# Patient Record
Sex: MALE | Race: WHITE | NOT HISPANIC OR LATINO | Employment: FULL TIME | URBAN - METROPOLITAN AREA
[De-identification: names, ages, dates, MRNs, and addresses within clinical notes are randomized per-mention and may not be internally consistent; named-entity substitution may affect disease eponyms.]

---

## 2025-07-25 ENCOUNTER — APPOINTMENT (OUTPATIENT)
Dept: RADIOLOGY | Facility: HOSPITAL | Age: 40
End: 2025-07-25

## 2025-07-25 ENCOUNTER — HOSPITAL ENCOUNTER (EMERGENCY)
Facility: HOSPITAL | Age: 40
Discharge: HOME | End: 2025-07-25

## 2025-07-25 VITALS
DIASTOLIC BLOOD PRESSURE: 85 MMHG | SYSTOLIC BLOOD PRESSURE: 136 MMHG | HEIGHT: 74 IN | HEART RATE: 75 BPM | OXYGEN SATURATION: 99 % | RESPIRATION RATE: 16 BRPM | BODY MASS INDEX: 22.46 KG/M2 | WEIGHT: 175 LBS | TEMPERATURE: 98.1 F

## 2025-07-25 DIAGNOSIS — M54.41 RIGHT-SIDED LOW BACK PAIN WITH RIGHT-SIDED SCIATICA, UNSPECIFIED CHRONICITY: Primary | ICD-10-CM

## 2025-07-25 PROCEDURE — 2500000001 HC RX 250 WO HCPCS SELF ADMINISTERED DRUGS (ALT 637 FOR MEDICARE OP): Performed by: PHYSICIAN ASSISTANT

## 2025-07-25 PROCEDURE — 96372 THER/PROPH/DIAG INJ SC/IM: CPT | Performed by: PHYSICIAN ASSISTANT

## 2025-07-25 PROCEDURE — 72131 CT LUMBAR SPINE W/O DYE: CPT

## 2025-07-25 PROCEDURE — 99284 EMERGENCY DEPT VISIT MOD MDM: CPT | Mod: 25

## 2025-07-25 PROCEDURE — 72131 CT LUMBAR SPINE W/O DYE: CPT | Performed by: STUDENT IN AN ORGANIZED HEALTH CARE EDUCATION/TRAINING PROGRAM

## 2025-07-25 PROCEDURE — 2500000004 HC RX 250 GENERAL PHARMACY W/ HCPCS (ALT 636 FOR OP/ED): Mod: JZ | Performed by: PHYSICIAN ASSISTANT

## 2025-07-25 RX ORDER — LIDOCAINE 50 MG/G
1 PATCH TOPICAL DAILY
Qty: 7 PATCH | Refills: 0 | Status: SHIPPED | OUTPATIENT
Start: 2025-07-25

## 2025-07-25 RX ORDER — KETOROLAC TROMETHAMINE 30 MG/ML
30 INJECTION, SOLUTION INTRAMUSCULAR; INTRAVENOUS ONCE
Status: COMPLETED | OUTPATIENT
Start: 2025-07-25 | End: 2025-07-25

## 2025-07-25 RX ORDER — TIZANIDINE 2 MG/1
2 TABLET ORAL EVERY 6 HOURS PRN
Qty: 30 TABLET | Refills: 0 | Status: SHIPPED | OUTPATIENT
Start: 2025-07-25 | End: 2025-08-04

## 2025-07-25 RX ORDER — CYCLOBENZAPRINE HCL 10 MG
5 TABLET ORAL ONCE
Status: COMPLETED | OUTPATIENT
Start: 2025-07-25 | End: 2025-07-25

## 2025-07-25 RX ORDER — OXYCODONE AND ACETAMINOPHEN 5; 325 MG/1; MG/1
1 TABLET ORAL ONCE
Refills: 0 | Status: COMPLETED | OUTPATIENT
Start: 2025-07-25 | End: 2025-07-25

## 2025-07-25 RX ORDER — METHYLPREDNISOLONE 4 MG/1
TABLET ORAL
Qty: 21 TABLET | Refills: 0 | Status: SHIPPED | OUTPATIENT
Start: 2025-07-25 | End: 2025-07-31

## 2025-07-25 RX ADMIN — KETOROLAC TROMETHAMINE 30 MG: 30 INJECTION, SOLUTION INTRAMUSCULAR at 15:07

## 2025-07-25 RX ADMIN — OXYCODONE HYDROCHLORIDE AND ACETAMINOPHEN 1 TABLET: 5; 325 TABLET ORAL at 13:55

## 2025-07-25 RX ADMIN — CYCLOBENZAPRINE 5 MG: 10 TABLET, FILM COATED ORAL at 13:55

## 2025-07-25 ASSESSMENT — LIFESTYLE VARIABLES
HAVE YOU EVER FELT YOU SHOULD CUT DOWN ON YOUR DRINKING: NO
EVER HAD A DRINK FIRST THING IN THE MORNING TO STEADY YOUR NERVES TO GET RID OF A HANGOVER: NO
EVER FELT BAD OR GUILTY ABOUT YOUR DRINKING: NO
TOTAL SCORE: 0
HAVE PEOPLE ANNOYED YOU BY CRITICIZING YOUR DRINKING: NO

## 2025-07-25 ASSESSMENT — PAIN SCALES - GENERAL
PAINLEVEL_OUTOF10: 6
PAINLEVEL_OUTOF10: 10 - WORST POSSIBLE PAIN

## 2025-07-25 ASSESSMENT — PAIN - FUNCTIONAL ASSESSMENT
PAIN_FUNCTIONAL_ASSESSMENT: 0-10
PAIN_FUNCTIONAL_ASSESSMENT: 0-10

## 2025-07-25 ASSESSMENT — PAIN DESCRIPTION - PAIN TYPE: TYPE: ACUTE PAIN

## 2025-07-25 ASSESSMENT — PAIN DESCRIPTION - LOCATION: LOCATION: BACK

## 2025-07-25 ASSESSMENT — PAIN DESCRIPTION - DESCRIPTORS: DESCRIPTORS: ACHING

## 2025-07-25 ASSESSMENT — PAIN DESCRIPTION - ORIENTATION: ORIENTATION: RIGHT;LOWER

## 2025-07-25 NOTE — Clinical Note
Dominguez Smith was seen and treated in our emergency department on 7/25/2025.  He may return to work on 07/28/2025.       If you have any questions or concerns, please don't hesitate to call.      Kia Harman, APRN-CNP

## 2025-07-25 NOTE — ED PROVIDER NOTES
EMERGENCY MEDICINE EVALUATION NOTE    History of Present Illness     Chief Complaint:   Chief Complaint   Patient presents with    lower back pain radiating right leg       HPI: Dominguez Smith is a 40 y.o. male presents with a chief complaint of right-sided low back pain rating down right leg.  Patient has had symptoms for some time but they got really bad on Wednesday.  He states he got really worse since Wednesday.  Patient denies any loss of bowel or bladder function denies any saddle paresthesias.  Patient denies any direct injury to the back just started hurting got worse.  He reports that he occasionally has tingling down the leg.  Patient denies the use of any anticoagulation.  Patient denies any fevers or chills.  Patient has used over-the-counter medications for symptoms which only minimally relieve his symptoms.  Patient also using a TENS unit which somewhat helps.    Previous History   Medical History[1]  Surgical History[2]  Social History[3]  Family History[4]  Allergies[5]  Current Outpatient Medications   Medication Instructions    lidocaine (Lidoderm) 5 % patch 1 patch, transdermal, Daily, Remove & discard patch within 12 hours or as directed by MD.    methylPREDNISolone (Medrol Dospak) 4 mg tablets Follow schedule on package instructions    tiZANidine (ZANAFLEX) 2 mg, oral, Every 6 hours PRN       Physical Exam     Appearance: Alert, oriented , cooperative     Skin: Intact,  dry skin, no lesions, rash, petechiae or purpura.      Eyes: PERRLA, EOMs intact,  Conjunctiva pink      ENT: Hearing grossly intact. Pharynx clear, uvula midline.      Neck: Supple. Trachea at midline.      Pulmonary: Clear bilaterally. No rales, rhonchi or wheezing. No accessory muscle use or stridor.     Cardiac: Normal rate and rhythm without murmur     Abdomen: Soft, nontender, active bowel sounds.     Musculoskeletal: Full range of motion.  Diffuse right-sided lumbar paraspinal tenderness.  No midline C, T, or L-spine  "tenderness.  Neuro vas intact in bilateral lower extremities.  Positive straight leg raise on right side.     Neurological:Cranial nerves II through XII are grossly intact, normal sensation, no weakness, no focal findings identified.     Results   Labs Reviewed - No data to display  CT lumbar spine wo IV contrast    (Results Pending)         ED Course & Medical Decision Making     Medications   oxyCODONE-acetaminophen (Percocet) 5-325 mg per tablet 1 tablet (1 tablet oral Given 7/25/25 1355)   cyclobenzaprine (Flexeril) tablet 5 mg (5 mg oral Given 7/25/25 1355)   ketorolac (Toradol) injection 30 mg (30 mg intramuscular Given 7/25/25 1507)     Heart Rate:  [73]   Temperature:  [36.7 °C (98.1 °F)]   Respirations:  [18]   BP: (126)/(85)   Height:  [188 cm (6' 2\")]   Weight:  [79.4 kg (175 lb)]   Pulse Ox:  [98 %]    ED Course as of 07/25/25 1513   Fri Jul 25, 2025   1455 Reassessed patient at this time patient today still experiencing some lower back pain.  At this time patient will receive a dose of IM Toradol while awaiting his CT scans [CJ]   1506 Patient endorsed to Kia Harman NP at shift change pending CT.  [CJ]      ED Course User Index  [CJ] Rene Alba PA-C         Diagnoses as of 07/25/25 1513   Right-sided low back pain with right-sided sciatica, unspecified chronicity       Procedures   Procedures    Diagnosis     1. Right-sided low back pain with right-sided sciatica, unspecified chronicity        Disposition   Signed out pending reevaluation to CT imaging    ED Prescriptions       Medication Sig Dispense Start Date End Date Auth. Provider    tiZANidine (Zanaflex) 2 mg tablet Take 1 tablet (2 mg) by mouth every 6 hours if needed for muscle spasms for up to 10 days. 30 tablet 7/25/2025 8/4/2025 Rene Alba PA-C    methylPREDNISolone (Medrol Dospak) 4 mg tablets Follow schedule on package instructions 21 tablet 7/25/2025 7/31/2025 Rene Alba PA-C    lidocaine (Lidoderm) 5 % patch Place 1 patch over " 12 hours on the skin once daily. Remove & discard patch within 12 hours or as directed by MD. 7 patch 7/25/2025 -- Rene Alba PA-C            Disclaimer: This note was dictated by speech recognition. Minor errors in transcription may be present. Please call if questions.         [1] No past medical history on file.  [2] No past surgical history on file.  [3]    [4] No family history on file.  [5]   Allergies  Allergen Reactions    Penicillin Unknown        Rene Alba PA-C  07/25/25 0441

## 2025-07-25 NOTE — PROGRESS NOTES
"ED TRANSITION OF CARE NOTE    Care received from: AMY Alba PA-C    I am the primary provider for this patient encounter: No    Tasks requested by the transferring provider for completion of the patient's assessment: Follow up CT(s): and Re-evaluate VS:    Transferring provider's disposition outlook and plan at time of handoff: Follow-up CT, recheck vital signs.  CT unlikely to show acute pathology as he has no focal neurologic deficits or red flags warranting emergent MRI.  Plan would be Medrol Dosepak, muscle relaxants and symptom management with outpatient follow-up.    Visit Vitals  /85   Pulse 75   Temp 36.7 °C (98.1 °F) (Temporal)   Resp 16   Ht 1.88 m (6' 2\")   Wt 79.4 kg (175 lb)   SpO2 99%   BMI 22.47 kg/m²   BSA 2.04 m²        CT lumbar spine wo IV contrast   Final Result   No acute fracture or traumatic malalignment.   Posterior disc bulges L4-L5 and L5-S1 contributing to at least mild   spinal canal stenosis at L4-L5. No significant bony foraminal or   spinal canal stenosis. If the patient has continued symptoms   referable to the lumbar spine further evaluation with MRI is   recommended.        MACRO:   None        Signed by: Carl Keating 7/25/2025 4:41 PM   Dictation workstation:   [id number]          Labs Reviewed - No data to display      ED Course & MDM   ED Course as of 07/25/25 1702   Fri Jul 25, 2025   1455 Reassessed patient at this time patient today still experiencing some lower back pain.  At this time patient will receive a dose of IM Toradol while awaiting his CT scans [CJ]   9385 Met patient with Rene at bedside signout.  He reports his pain to be improved.  No focal neurologic deficits.  He is aware he is going for CT.  Plan is for Medrol dose pack, muscle relaxant and Lidoderm patches which will be sent by predecessor to his pharmacy for outpatient management should CT imaging be as anticipated.  Patient agrees and ready for CT. [NA]   9622 Patient endorsed to Kia Harman NP " at shift change pending CT.  [CJ]   1700 Patient feeling improved and has been ambulatory in the emergency department.  No focal neurologic deficit.  Patient was given his CT report and we discussed our outpatient management plan which she is comfortable with.  Plan is for prescriptions as previously sent by his initial practitioner which is for Medrol Dosepak, tizanidine and Lidoderm patches.  I also placed a primary care referral as well as physical therapy and registration will attempt to schedule him for primary care prior to his departure today. Work note provided. Patient is non-toxic, not hypoxic and appropriate for this outpatient management plan which they prefer. Encouragement to arrange close follow up was discussed as well as provided in a written handout of discharge instructions. Patient was educated on signs of symptoms to watch for indicative of re-evaluation in the emergency department setting to include any worsening of current symptoms. They verbalized understanding of instructions and is amenable to this treatment plan with no other social determinants of health that would obscure this plan. Patient departed in stable condition.  [NA]      ED Course User Index  [CJ] Rene Alba PA-C  [NA] Kia Harman, AYDEE-CNP         Diagnoses as of 07/25/25 1702   Right-sided low back pain with right-sided sciatica, unspecified chronicity           Medical Decision Making         Your medication list        START taking these medications        Instructions Last Dose Given Next Dose Due   lidocaine 5 % patch  Commonly known as: Lidoderm      Place 1 patch over 12 hours on the skin once daily. Remove & discard patch within 12 hours or as directed by MD.       methylPREDNISolone 4 mg tablets  Commonly known as: Medrol Dospak      Follow schedule on package instructions       tiZANidine 2 mg tablet  Commonly known as: Zanaflex      Take 1 tablet (2 mg) by mouth every 6 hours if needed for muscle spasms for  up to 10 days.                 Where to Get Your Medications        These medications were sent to [facility] [address] [facility] [place] - [address]  [address] [place] [address]      Phone: [phone]   lidocaine 5 % patch  methylPREDNISolone 4 mg tablets  tiZANidine 2 mg tablet         Procedure  None     *This report was transcribed using voice recognition software.  Every effort was made to ensure accuracy; however, inadvertent computerized transcription errors may be present.*  Kia Harman, AYDEE-CNP  07/25/25

## 2025-08-01 ENCOUNTER — APPOINTMENT (OUTPATIENT)
Dept: PRIMARY CARE | Facility: CLINIC | Age: 40
End: 2025-08-01

## 2025-08-08 ENCOUNTER — DOCUMENTATION (OUTPATIENT)
Dept: PHYSICAL THERAPY | Facility: CLINIC | Age: 40
End: 2025-08-08

## 2025-08-08 NOTE — PROGRESS NOTES
Communication Note    Patient Name: Dominguez Smith  MRN: [id number]  : 1985  Today's Date: 2025     Discipline: Physical Therapy    Missed Time: No Show